# Patient Record
Sex: MALE | Race: WHITE | Employment: OTHER | URBAN - METROPOLITAN AREA
[De-identification: names, ages, dates, MRNs, and addresses within clinical notes are randomized per-mention and may not be internally consistent; named-entity substitution may affect disease eponyms.]

---

## 2017-01-19 ENCOUNTER — OP HISTORICAL/CONVERTED ENCOUNTER (OUTPATIENT)
Dept: OTHER | Age: 52
End: 2017-01-19

## 2017-01-27 ENCOUNTER — OP HISTORICAL/CONVERTED ENCOUNTER (OUTPATIENT)
Dept: OTHER | Age: 52
End: 2017-01-27

## 2017-09-26 ENCOUNTER — OP HISTORICAL/CONVERTED ENCOUNTER (OUTPATIENT)
Dept: OTHER | Age: 52
End: 2017-09-26

## 2017-09-29 ENCOUNTER — OP HISTORICAL/CONVERTED ENCOUNTER (OUTPATIENT)
Dept: OTHER | Age: 52
End: 2017-09-29

## 2017-12-02 ENCOUNTER — ED HISTORICAL/CONVERTED ENCOUNTER (OUTPATIENT)
Dept: OTHER | Age: 52
End: 2017-12-02

## 2019-11-04 ENCOUNTER — OP HISTORICAL/CONVERTED ENCOUNTER (OUTPATIENT)
Dept: OTHER | Age: 54
End: 2019-11-04

## 2022-04-21 ENCOUNTER — HOSPITAL ENCOUNTER (OUTPATIENT)
Dept: PREADMISSION TESTING | Age: 57
Discharge: HOME | End: 2022-04-21
Payer: COMMERCIAL

## 2022-04-21 ENCOUNTER — HOSPITAL ENCOUNTER (OUTPATIENT)
Dept: GENERAL RADIOLOGY | Age: 57
Discharge: HOME | End: 2022-04-21
Attending: ORTHOPAEDIC SURGERY
Payer: COMMERCIAL

## 2022-04-21 VITALS
OXYGEN SATURATION: 100 % | WEIGHT: 176.4 LBS | HEART RATE: 58 BPM | BODY MASS INDEX: 26.73 KG/M2 | HEIGHT: 68 IN | SYSTOLIC BLOOD PRESSURE: 110 MMHG | DIASTOLIC BLOOD PRESSURE: 84 MMHG | TEMPERATURE: 97.8 F | RESPIRATION RATE: 16 BRPM

## 2022-04-21 LAB
ABO + RH BLD: NORMAL
ANION GAP SERPL CALC-SCNC: 5 MMOL/L (ref 5–15)
APPEARANCE UR: CLEAR
APTT PPP: 28.9 SEC (ref 21.2–34.1)
ATRIAL RATE: 53 BPM
BACTERIA URNS QL MICRO: NEGATIVE /HPF
BILIRUB UR QL: NEGATIVE
BLOOD GROUP ANTIBODIES SERPL: NEGATIVE
BUN SERPL-MCNC: 19 MG/DL (ref 6–20)
BUN/CREAT SERPL: 21 (ref 12–20)
CA-I BLD-MCNC: 8.9 MG/DL (ref 8.5–10.1)
CALCULATED P AXIS, ECG09: 64 DEGREES
CALCULATED R AXIS, ECG10: 59 DEGREES
CALCULATED T AXIS, ECG11: 60 DEGREES
CHLORIDE SERPL-SCNC: 105 MMOL/L (ref 97–108)
CO2 SERPL-SCNC: 29 MMOL/L (ref 21–32)
COLOR UR: NORMAL
CREAT SERPL-MCNC: 0.9 MG/DL (ref 0.7–1.3)
DIAGNOSIS, 93000: NORMAL
ERYTHROCYTE [DISTWIDTH] IN BLOOD BY AUTOMATED COUNT: 12.4 % (ref 11.5–14.5)
GLUCOSE SERPL-MCNC: 92 MG/DL (ref 65–100)
GLUCOSE UR STRIP.AUTO-MCNC: NEGATIVE MG/DL
HCT VFR BLD AUTO: 47.5 % (ref 36.6–50.3)
HGB BLD-MCNC: 15.5 G/DL (ref 12.1–17)
HGB UR QL STRIP: NEGATIVE
INR PPP: 1.1 (ref 0.9–1.1)
KETONES UR QL STRIP.AUTO: NEGATIVE MG/DL
LEUKOCYTE ESTERASE UR QL STRIP.AUTO: NEGATIVE
MCH RBC QN AUTO: 29.9 PG (ref 26–34)
MCHC RBC AUTO-ENTMCNC: 32.6 G/DL (ref 30–36.5)
MCV RBC AUTO: 91.7 FL (ref 80–99)
MRSA DNA SPEC QL NAA+PROBE: NOT DETECTED
MUCOUS THREADS URNS QL MICRO: NORMAL /LPF
NITRITE UR QL STRIP.AUTO: NEGATIVE
NRBC # BLD: 0 K/UL (ref 0–0.01)
NRBC BLD-RTO: 0 PER 100 WBC
P-R INTERVAL, ECG05: 196 MS
PH UR STRIP: 7 [PH] (ref 5–8)
PLATELET # BLD AUTO: 211 K/UL (ref 150–400)
PMV BLD AUTO: 9.5 FL (ref 8.9–12.9)
POTASSIUM SERPL-SCNC: 4.3 MMOL/L (ref 3.5–5.1)
PROT UR STRIP-MCNC: NEGATIVE MG/DL
PROTHROMBIN TIME: 14 SEC (ref 11.9–14.6)
Q-T INTERVAL, ECG07: 428 MS
QRS DURATION, ECG06: 88 MS
QTC CALCULATION (BEZET), ECG08: 401 MS
RBC # BLD AUTO: 5.18 M/UL (ref 4.1–5.7)
RBC #/AREA URNS HPF: NORMAL /HPF (ref 0–5)
SODIUM SERPL-SCNC: 139 MMOL/L (ref 136–145)
SP GR UR REFRACTOMETRY: 1.01 (ref 1–1.03)
SPECIMEN EXP DATE BLD: NORMAL
THERAPEUTIC RANGE,PTTT: NORMAL SEC (ref 82–109)
UROBILINOGEN UR QL STRIP.AUTO: 0.1 EU/DL (ref 0.1–1)
VENTRICULAR RATE, ECG03: 53 BPM
WBC # BLD AUTO: 5.5 K/UL (ref 4.1–11.1)
WBC URNS QL MICRO: NORMAL /HPF (ref 0–4)

## 2022-04-21 PROCEDURE — 86900 BLOOD TYPING SEROLOGIC ABO: CPT

## 2022-04-21 PROCEDURE — 81001 URINALYSIS AUTO W/SCOPE: CPT

## 2022-04-21 PROCEDURE — 87086 URINE CULTURE/COLONY COUNT: CPT

## 2022-04-21 PROCEDURE — 85610 PROTHROMBIN TIME: CPT

## 2022-04-21 PROCEDURE — 93005 ELECTROCARDIOGRAM TRACING: CPT

## 2022-04-21 PROCEDURE — 87641 MR-STAPH DNA AMP PROBE: CPT

## 2022-04-21 PROCEDURE — 85027 COMPLETE CBC AUTOMATED: CPT

## 2022-04-21 PROCEDURE — 80048 BASIC METABOLIC PNL TOTAL CA: CPT

## 2022-04-21 PROCEDURE — 71046 X-RAY EXAM CHEST 2 VIEWS: CPT

## 2022-04-21 PROCEDURE — 36415 COLL VENOUS BLD VENIPUNCTURE: CPT

## 2022-04-21 PROCEDURE — 85730 THROMBOPLASTIN TIME PARTIAL: CPT

## 2022-04-21 RX ORDER — VITAMIN E 268 MG
400 CAPSULE ORAL DAILY
COMMUNITY

## 2022-04-21 RX ORDER — ASCORBIC ACID 250 MG
250 TABLET ORAL DAILY
COMMUNITY

## 2022-04-22 LAB
BACTERIA SPEC CULT: NORMAL
SPECIAL REQUESTS,SREQ: NORMAL

## 2022-05-02 RX ORDER — METOPROLOL TARTRATE 5 MG/5ML
INJECTION INTRAVENOUS
Status: DISPENSED
Start: 2022-05-02 | End: 2022-05-03

## 2022-05-03 ENCOUNTER — APPOINTMENT (OUTPATIENT)
Dept: GENERAL RADIOLOGY | Age: 57
End: 2022-05-03
Attending: ORTHOPAEDIC SURGERY
Payer: COMMERCIAL

## 2022-05-03 ENCOUNTER — HOSPITAL ENCOUNTER (OUTPATIENT)
Age: 57
Discharge: HOME | End: 2022-05-04
Attending: ORTHOPAEDIC SURGERY | Admitting: ORTHOPAEDIC SURGERY
Payer: COMMERCIAL

## 2022-05-03 ENCOUNTER — ANESTHESIA (OUTPATIENT)
Dept: SURGERY | Age: 57
End: 2022-05-03
Payer: COMMERCIAL

## 2022-05-03 ENCOUNTER — ANESTHESIA EVENT (OUTPATIENT)
Dept: SURGERY | Age: 57
End: 2022-05-03
Payer: COMMERCIAL

## 2022-05-03 DIAGNOSIS — M43.06 LUMBAR SPONDYLOLYSIS: Primary | ICD-10-CM

## 2022-05-03 PROCEDURE — 74011000250 HC RX REV CODE- 250: Performed by: NURSE PRACTITIONER

## 2022-05-03 PROCEDURE — C1713 ANCHOR/SCREW BN/BN,TIS/BN: HCPCS | Performed by: ORTHOPAEDIC SURGERY

## 2022-05-03 PROCEDURE — 77030031139 HC SUT VCRL2 J&J -A: Performed by: ORTHOPAEDIC SURGERY

## 2022-05-03 PROCEDURE — 76000 FLUOROSCOPY <1 HR PHYS/QHP: CPT

## 2022-05-03 PROCEDURE — 77030041237 HC BLNKT WARM MDII -A: Performed by: ORTHOPAEDIC SURGERY

## 2022-05-03 PROCEDURE — 77030018673: Performed by: ORTHOPAEDIC SURGERY

## 2022-05-03 PROCEDURE — 77030005515 HC CATH URETH FOL14 BARD -B: Performed by: ORTHOPAEDIC SURGERY

## 2022-05-03 PROCEDURE — C1889 IMPLANT/INSERT DEVICE, NOC: HCPCS | Performed by: ORTHOPAEDIC SURGERY

## 2022-05-03 PROCEDURE — 74011250636 HC RX REV CODE- 250/636: Performed by: ORTHOPAEDIC SURGERY

## 2022-05-03 PROCEDURE — 77030008480 HC STYL W/CAP J&J -C: Performed by: ORTHOPAEDIC SURGERY

## 2022-05-03 PROCEDURE — 74011000250 HC RX REV CODE- 250: Performed by: ORTHOPAEDIC SURGERY

## 2022-05-03 PROCEDURE — 72100 X-RAY EXAM L-S SPINE 2/3 VWS: CPT

## 2022-05-03 PROCEDURE — 77030028271 HC SRGFL HEMSTAT MTRX KT J&J -C: Performed by: ORTHOPAEDIC SURGERY

## 2022-05-03 PROCEDURE — 77030038156 HC CRD BPLR DISP CARF -A: Performed by: ORTHOPAEDIC SURGERY

## 2022-05-03 PROCEDURE — 76060000042 HC ANESTHESIA 5.5 TO 6 HR: Performed by: ORTHOPAEDIC SURGERY

## 2022-05-03 PROCEDURE — 76010000178 HC OR TIME 5.5 TO 6 HR INTENSV-TIER 1: Performed by: ORTHOPAEDIC SURGERY

## 2022-05-03 PROCEDURE — 74011250637 HC RX REV CODE- 250/637: Performed by: ORTHOPAEDIC SURGERY

## 2022-05-03 PROCEDURE — 76210000016 HC OR PH I REC 1 TO 1.5 HR: Performed by: ORTHOPAEDIC SURGERY

## 2022-05-03 PROCEDURE — 77030040361 HC SLV COMPR DVT MDII -B: Performed by: ORTHOPAEDIC SURGERY

## 2022-05-03 PROCEDURE — 74011250636 HC RX REV CODE- 250/636: Performed by: NURSE PRACTITIONER

## 2022-05-03 PROCEDURE — 77030034479 HC ADH SKN CLSR PRINEO J&J -B: Performed by: ORTHOPAEDIC SURGERY

## 2022-05-03 PROCEDURE — 2709999900 HC NON-CHARGEABLE SUPPLY: Performed by: ORTHOPAEDIC SURGERY

## 2022-05-03 PROCEDURE — 77030002933 HC SUT MCRYL J&J -A: Performed by: ORTHOPAEDIC SURGERY

## 2022-05-03 PROCEDURE — 87086 URINE CULTURE/COLONY COUNT: CPT

## 2022-05-03 PROCEDURE — 77030011264 HC ELECTRD BLD EXT COVD -A: Performed by: ORTHOPAEDIC SURGERY

## 2022-05-03 PROCEDURE — P9045 ALBUMIN (HUMAN), 5%, 250 ML: HCPCS | Performed by: NURSE PRACTITIONER

## 2022-05-03 PROCEDURE — 36415 COLL VENOUS BLD VENIPUNCTURE: CPT

## 2022-05-03 DEVICE — IMPLANTABLE DEVICE: Type: IMPLANTABLE DEVICE | Site: SPINE LUMBAR | Status: FUNCTIONAL

## 2022-05-03 DEVICE — SCREW SPNL L45MM DIA6MM TI POLYAX EXT TAB FOR MINIMALLY: Type: IMPLANTABLE DEVICE | Site: SPINE LUMBAR | Status: FUNCTIONAL

## 2022-05-03 DEVICE — SCREW SPNL L50MM DIA7MM TI POLYAX EXT TAB FOR MINIMALLY: Type: IMPLANTABLE DEVICE | Site: SPINE LUMBAR | Status: FUNCTIONAL

## 2022-05-03 DEVICE — GRAFT BNE SUB M 5ML CANC DBM FRMBL CELLULAR VIVIGEN: Type: IMPLANTABLE DEVICE | Site: SPINE LUMBAR | Status: FUNCTIONAL

## 2022-05-03 DEVICE — SET SCR SPNL TI SGL INNR FOR VIPER 2 MINIMALLY INVASIVE: Type: IMPLANTABLE DEVICE | Site: SPINE LUMBAR | Status: FUNCTIONAL

## 2022-05-03 RX ORDER — SODIUM CHLORIDE 0.9 % (FLUSH) 0.9 %
5-40 SYRINGE (ML) INJECTION EVERY 8 HOURS
Status: DISCONTINUED | OUTPATIENT
Start: 2022-05-03 | End: 2022-05-03

## 2022-05-03 RX ORDER — LIDOCAINE HYDROCHLORIDE 10 MG/ML
0.1 INJECTION, SOLUTION EPIDURAL; INFILTRATION; INTRACAUDAL; PERINEURAL AS NEEDED
Status: DISCONTINUED | OUTPATIENT
Start: 2022-05-03 | End: 2022-05-03 | Stop reason: HOSPADM

## 2022-05-03 RX ORDER — MIDAZOLAM HYDROCHLORIDE 1 MG/ML
INJECTION, SOLUTION INTRAMUSCULAR; INTRAVENOUS AS NEEDED
Status: DISCONTINUED | OUTPATIENT
Start: 2022-05-03 | End: 2022-05-03 | Stop reason: HOSPADM

## 2022-05-03 RX ORDER — NALOXONE HYDROCHLORIDE 0.4 MG/ML
0.4 INJECTION, SOLUTION INTRAMUSCULAR; INTRAVENOUS; SUBCUTANEOUS AS NEEDED
Status: DISCONTINUED | OUTPATIENT
Start: 2022-05-03 | End: 2022-05-04 | Stop reason: HOSPADM

## 2022-05-03 RX ORDER — LABETALOL HCL 20 MG/4 ML
5 SYRINGE (ML) INTRAVENOUS
Status: DISCONTINUED | OUTPATIENT
Start: 2022-05-03 | End: 2022-05-03 | Stop reason: HOSPADM

## 2022-05-03 RX ORDER — DIAZEPAM 10 MG/2ML
5 INJECTION INTRAMUSCULAR ONCE
Status: COMPLETED | OUTPATIENT
Start: 2022-05-03 | End: 2022-05-03

## 2022-05-03 RX ORDER — FENTANYL CITRATE 50 UG/ML
INJECTION, SOLUTION INTRAMUSCULAR; INTRAVENOUS AS NEEDED
Status: DISCONTINUED | OUTPATIENT
Start: 2022-05-03 | End: 2022-05-03 | Stop reason: HOSPADM

## 2022-05-03 RX ORDER — DIPHENHYDRAMINE HYDROCHLORIDE 50 MG/ML
12.5 INJECTION, SOLUTION INTRAMUSCULAR; INTRAVENOUS AS NEEDED
Status: DISCONTINUED | OUTPATIENT
Start: 2022-05-03 | End: 2022-05-03 | Stop reason: HOSPADM

## 2022-05-03 RX ORDER — CELECOXIB 200 MG/1
400 CAPSULE ORAL ONCE
Status: COMPLETED | OUTPATIENT
Start: 2022-05-03 | End: 2022-05-03

## 2022-05-03 RX ORDER — LIDOCAINE HYDROCHLORIDE 20 MG/ML
INJECTION, SOLUTION EPIDURAL; INFILTRATION; INTRACAUDAL; PERINEURAL AS NEEDED
Status: DISCONTINUED | OUTPATIENT
Start: 2022-05-03 | End: 2022-05-03 | Stop reason: HOSPADM

## 2022-05-03 RX ORDER — SODIUM CHLORIDE 0.9 % (FLUSH) 0.9 %
5-40 SYRINGE (ML) INJECTION AS NEEDED
Status: DISCONTINUED | OUTPATIENT
Start: 2022-05-03 | End: 2022-05-04 | Stop reason: HOSPADM

## 2022-05-03 RX ORDER — ACETAMINOPHEN 325 MG/1
650 TABLET ORAL EVERY 6 HOURS
Status: DISCONTINUED | OUTPATIENT
Start: 2022-05-03 | End: 2022-05-04 | Stop reason: HOSPADM

## 2022-05-03 RX ORDER — SODIUM CHLORIDE 9 MG/ML
INJECTION, SOLUTION INTRAVENOUS
Status: DISCONTINUED | OUTPATIENT
Start: 2022-05-03 | End: 2022-05-03 | Stop reason: HOSPADM

## 2022-05-03 RX ORDER — DEXAMETHASONE SODIUM PHOSPHATE 4 MG/ML
INJECTION, SOLUTION INTRA-ARTICULAR; INTRALESIONAL; INTRAMUSCULAR; INTRAVENOUS; SOFT TISSUE AS NEEDED
Status: DISCONTINUED | OUTPATIENT
Start: 2022-05-03 | End: 2022-05-03 | Stop reason: HOSPADM

## 2022-05-03 RX ORDER — ONDANSETRON 2 MG/ML
4 INJECTION INTRAMUSCULAR; INTRAVENOUS AS NEEDED
Status: DISCONTINUED | OUTPATIENT
Start: 2022-05-03 | End: 2022-05-03 | Stop reason: HOSPADM

## 2022-05-03 RX ORDER — OXYCODONE HYDROCHLORIDE 5 MG/1
5 TABLET ORAL
Status: DISCONTINUED | OUTPATIENT
Start: 2022-05-03 | End: 2022-05-04 | Stop reason: HOSPADM

## 2022-05-03 RX ORDER — HYDROMORPHONE HYDROCHLORIDE 1 MG/ML
0.4 INJECTION, SOLUTION INTRAMUSCULAR; INTRAVENOUS; SUBCUTANEOUS
Status: DISCONTINUED | OUTPATIENT
Start: 2022-05-03 | End: 2022-05-03 | Stop reason: HOSPADM

## 2022-05-03 RX ORDER — PROPOFOL 10 MG/ML
INJECTION, EMULSION INTRAVENOUS AS NEEDED
Status: DISCONTINUED | OUTPATIENT
Start: 2022-05-03 | End: 2022-05-03 | Stop reason: HOSPADM

## 2022-05-03 RX ORDER — SODIUM CHLORIDE 0.9 % (FLUSH) 0.9 %
5-40 SYRINGE (ML) INJECTION EVERY 8 HOURS
Status: DISCONTINUED | OUTPATIENT
Start: 2022-05-03 | End: 2022-05-03 | Stop reason: HOSPADM

## 2022-05-03 RX ORDER — FACIAL-BODY WIPES
10 EACH TOPICAL DAILY PRN
Status: DISCONTINUED | OUTPATIENT
Start: 2022-05-05 | End: 2022-05-04 | Stop reason: HOSPADM

## 2022-05-03 RX ORDER — NORETHINDRONE AND ETHINYL ESTRADIOL 0.5-0.035
KIT ORAL AS NEEDED
Status: DISCONTINUED | OUTPATIENT
Start: 2022-05-03 | End: 2022-05-03 | Stop reason: HOSPADM

## 2022-05-03 RX ORDER — FENTANYL CITRATE 50 UG/ML
50 INJECTION, SOLUTION INTRAMUSCULAR; INTRAVENOUS AS NEEDED
Status: DISCONTINUED | OUTPATIENT
Start: 2022-05-03 | End: 2022-05-03 | Stop reason: HOSPADM

## 2022-05-03 RX ORDER — SUCCINYLCHOLINE CHLORIDE 20 MG/ML
INJECTION INTRAMUSCULAR; INTRAVENOUS AS NEEDED
Status: DISCONTINUED | OUTPATIENT
Start: 2022-05-03 | End: 2022-05-03 | Stop reason: HOSPADM

## 2022-05-03 RX ORDER — VITAMIN E CAP 100 UNIT 100 UNIT
400 CAP ORAL DAILY
Status: DISCONTINUED | OUTPATIENT
Start: 2022-05-04 | End: 2022-05-04 | Stop reason: CLARIF

## 2022-05-03 RX ORDER — OXYCODONE HCL 10 MG/1
10 TABLET, FILM COATED, EXTENDED RELEASE ORAL ONCE
Status: COMPLETED | OUTPATIENT
Start: 2022-05-03 | End: 2022-05-03

## 2022-05-03 RX ORDER — OXYCODONE AND ACETAMINOPHEN 5; 325 MG/1; MG/1
1 TABLET ORAL AS NEEDED
Status: DISCONTINUED | OUTPATIENT
Start: 2022-05-03 | End: 2022-05-03 | Stop reason: HOSPADM

## 2022-05-03 RX ORDER — AMOXICILLIN 250 MG
1 CAPSULE ORAL 2 TIMES DAILY
Status: DISCONTINUED | OUTPATIENT
Start: 2022-05-03 | End: 2022-05-04 | Stop reason: HOSPADM

## 2022-05-03 RX ORDER — SODIUM CHLORIDE 0.9 % (FLUSH) 0.9 %
5-40 SYRINGE (ML) INJECTION AS NEEDED
Status: DISCONTINUED | OUTPATIENT
Start: 2022-05-03 | End: 2022-05-03 | Stop reason: HOSPADM

## 2022-05-03 RX ORDER — HYDROMORPHONE HYDROCHLORIDE 1 MG/ML
INJECTION, SOLUTION INTRAMUSCULAR; INTRAVENOUS; SUBCUTANEOUS AS NEEDED
Status: DISCONTINUED | OUTPATIENT
Start: 2022-05-03 | End: 2022-05-03 | Stop reason: HOSPADM

## 2022-05-03 RX ORDER — HYDROMORPHONE HYDROCHLORIDE 1 MG/ML
INJECTION, SOLUTION INTRAMUSCULAR; INTRAVENOUS; SUBCUTANEOUS
Status: DISPENSED
Start: 2022-05-03 | End: 2022-05-04

## 2022-05-03 RX ORDER — MORPHINE SULFATE 2 MG/ML
2 INJECTION, SOLUTION INTRAMUSCULAR; INTRAVENOUS
Status: ACTIVE | OUTPATIENT
Start: 2022-05-03 | End: 2022-05-04

## 2022-05-03 RX ORDER — ALBUMIN HUMAN 50 G/1000ML
SOLUTION INTRAVENOUS AS NEEDED
Status: DISCONTINUED | OUTPATIENT
Start: 2022-05-03 | End: 2022-05-03 | Stop reason: HOSPADM

## 2022-05-03 RX ORDER — DIAZEPAM 5 MG/1
5 TABLET ORAL
Status: DISCONTINUED | OUTPATIENT
Start: 2022-05-03 | End: 2022-05-03 | Stop reason: SDUPTHER

## 2022-05-03 RX ORDER — MIDAZOLAM HYDROCHLORIDE 1 MG/ML
0.5 INJECTION, SOLUTION INTRAMUSCULAR; INTRAVENOUS
Status: DISCONTINUED | OUTPATIENT
Start: 2022-05-03 | End: 2022-05-03 | Stop reason: HOSPADM

## 2022-05-03 RX ORDER — POLYETHYLENE GLYCOL 3350 17 G/17G
17 POWDER, FOR SOLUTION ORAL DAILY
Status: DISCONTINUED | OUTPATIENT
Start: 2022-05-04 | End: 2022-05-04 | Stop reason: HOSPADM

## 2022-05-03 RX ORDER — DIAZEPAM 5 MG/1
5 TABLET ORAL
Status: DISCONTINUED | OUTPATIENT
Start: 2022-05-03 | End: 2022-05-04 | Stop reason: HOSPADM

## 2022-05-03 RX ORDER — SODIUM CHLORIDE, SODIUM LACTATE, POTASSIUM CHLORIDE, CALCIUM CHLORIDE 600; 310; 30; 20 MG/100ML; MG/100ML; MG/100ML; MG/100ML
INJECTION, SOLUTION INTRAVENOUS
Status: DISCONTINUED | OUTPATIENT
Start: 2022-05-03 | End: 2022-05-03 | Stop reason: HOSPADM

## 2022-05-03 RX ORDER — BUPIVACAINE HYDROCHLORIDE 2.5 MG/ML
INJECTION, SOLUTION EPIDURAL; INFILTRATION; INTRACAUDAL AS NEEDED
Status: DISCONTINUED | OUTPATIENT
Start: 2022-05-03 | End: 2022-05-03 | Stop reason: HOSPADM

## 2022-05-03 RX ORDER — ROCURONIUM BROMIDE 10 MG/ML
INJECTION, SOLUTION INTRAVENOUS AS NEEDED
Status: DISCONTINUED | OUTPATIENT
Start: 2022-05-03 | End: 2022-05-03 | Stop reason: HOSPADM

## 2022-05-03 RX ORDER — ONDANSETRON 2 MG/ML
INJECTION INTRAMUSCULAR; INTRAVENOUS AS NEEDED
Status: DISCONTINUED | OUTPATIENT
Start: 2022-05-03 | End: 2022-05-03 | Stop reason: HOSPADM

## 2022-05-03 RX ORDER — KETOROLAC TROMETHAMINE 30 MG/ML
30 INJECTION, SOLUTION INTRAMUSCULAR; INTRAVENOUS EVERY 6 HOURS
Status: COMPLETED | OUTPATIENT
Start: 2022-05-03 | End: 2022-05-04

## 2022-05-03 RX ORDER — MORPHINE SULFATE 10 MG/ML
2 INJECTION, SOLUTION INTRAMUSCULAR; INTRAVENOUS
Status: DISCONTINUED | OUTPATIENT
Start: 2022-05-03 | End: 2022-05-03 | Stop reason: HOSPADM

## 2022-05-03 RX ORDER — SODIUM CHLORIDE, SODIUM LACTATE, POTASSIUM CHLORIDE, CALCIUM CHLORIDE 600; 310; 30; 20 MG/100ML; MG/100ML; MG/100ML; MG/100ML
20 INJECTION, SOLUTION INTRAVENOUS CONTINUOUS
Status: DISCONTINUED | OUTPATIENT
Start: 2022-05-03 | End: 2022-05-03

## 2022-05-03 RX ORDER — NORETHINDRONE AND ETHINYL ESTRADIOL 0.5-0.035
5 KIT ORAL AS NEEDED
Status: DISCONTINUED | OUTPATIENT
Start: 2022-05-03 | End: 2022-05-03 | Stop reason: HOSPADM

## 2022-05-03 RX ORDER — MIDAZOLAM HYDROCHLORIDE 1 MG/ML
1 INJECTION, SOLUTION INTRAMUSCULAR; INTRAVENOUS AS NEEDED
Status: DISCONTINUED | OUTPATIENT
Start: 2022-05-03 | End: 2022-05-03 | Stop reason: HOSPADM

## 2022-05-03 RX ORDER — METOPROLOL TARTRATE 5 MG/5ML
2.5 INJECTION INTRAVENOUS
Status: DISCONTINUED | OUTPATIENT
Start: 2022-05-03 | End: 2022-05-03 | Stop reason: HOSPADM

## 2022-05-03 RX ORDER — CEFAZOLIN SODIUM 1 G/3ML
INJECTION, POWDER, FOR SOLUTION INTRAMUSCULAR; INTRAVENOUS AS NEEDED
Status: DISCONTINUED | OUTPATIENT
Start: 2022-05-03 | End: 2022-05-03 | Stop reason: HOSPADM

## 2022-05-03 RX ORDER — FENTANYL CITRATE 50 UG/ML
50 INJECTION, SOLUTION INTRAMUSCULAR; INTRAVENOUS
Status: DISCONTINUED | OUTPATIENT
Start: 2022-05-03 | End: 2022-05-03 | Stop reason: HOSPADM

## 2022-05-03 RX ORDER — OXYCODONE HYDROCHLORIDE 10 MG/1
10 TABLET ORAL
Status: DISCONTINUED | OUTPATIENT
Start: 2022-05-03 | End: 2022-05-04 | Stop reason: HOSPADM

## 2022-05-03 RX ORDER — ONDANSETRON 2 MG/ML
4 INJECTION INTRAMUSCULAR; INTRAVENOUS
Status: ACTIVE | OUTPATIENT
Start: 2022-05-03 | End: 2022-05-04

## 2022-05-03 RX ORDER — ASCORBIC ACID 250 MG
250 TABLET ORAL DAILY
Status: DISCONTINUED | OUTPATIENT
Start: 2022-05-04 | End: 2022-05-04 | Stop reason: HOSPADM

## 2022-05-03 RX ORDER — SODIUM CHLORIDE 9 MG/ML
125 INJECTION, SOLUTION INTRAVENOUS CONTINUOUS
Status: DISPENSED | OUTPATIENT
Start: 2022-05-03 | End: 2022-05-04

## 2022-05-03 RX ORDER — HYDRALAZINE HYDROCHLORIDE 20 MG/ML
10 INJECTION INTRAMUSCULAR; INTRAVENOUS
Status: DISCONTINUED | OUTPATIENT
Start: 2022-05-03 | End: 2022-05-03 | Stop reason: HOSPADM

## 2022-05-03 RX ADMIN — OXYCODONE HYDROCHLORIDE 10 MG: 10 TABLET, FILM COATED, EXTENDED RELEASE ORAL at 07:45

## 2022-05-03 RX ADMIN — FENTANYL CITRATE 50 MCG: 50 INJECTION, SOLUTION INTRAMUSCULAR; INTRAVENOUS at 09:04

## 2022-05-03 RX ADMIN — LIDOCAINE HYDROCHLORIDE 100 MG: 20 INJECTION, SOLUTION EPIDURAL; INFILTRATION; INTRACAUDAL; PERINEURAL at 09:39

## 2022-05-03 RX ADMIN — EPHEDRINE SULFATE 10 MG: 50 INJECTION INTRAVENOUS at 09:38

## 2022-05-03 RX ADMIN — EPHEDRINE SULFATE 10 MG: 50 INJECTION INTRAVENOUS at 13:17

## 2022-05-03 RX ADMIN — SODIUM CHLORIDE: 9 INJECTION, SOLUTION INTRAVENOUS at 09:15

## 2022-05-03 RX ADMIN — CELECOXIB 400 MG: 200 CAPSULE ORAL at 07:22

## 2022-05-03 RX ADMIN — ROCURONIUM BROMIDE 30 MG: 50 INJECTION, SOLUTION INTRAVENOUS at 11:28

## 2022-05-03 RX ADMIN — PHENYLEPHRINE HYDROCHLORIDE 100 MCG: 10 INJECTION INTRAVENOUS at 09:21

## 2022-05-03 RX ADMIN — HYDROMORPHONE HYDROCHLORIDE 1 MG: 1 INJECTION, SOLUTION INTRAMUSCULAR; INTRAVENOUS; SUBCUTANEOUS at 13:48

## 2022-05-03 RX ADMIN — FENTANYL CITRATE 50 MCG: 50 INJECTION, SOLUTION INTRAMUSCULAR; INTRAVENOUS at 09:08

## 2022-05-03 RX ADMIN — SUGAMMADEX 200 MG: 100 INJECTION, SOLUTION INTRAVENOUS at 14:12

## 2022-05-03 RX ADMIN — PHENYLEPHRINE HYDROCHLORIDE 100 MCG: 10 INJECTION INTRAVENOUS at 13:17

## 2022-05-03 RX ADMIN — DIAZEPAM 5 MG: 10 INJECTION, SOLUTION INTRAMUSCULAR; INTRAVENOUS at 15:02

## 2022-05-03 RX ADMIN — KETOROLAC TROMETHAMINE 30 MG: 30 INJECTION, SOLUTION INTRAMUSCULAR; INTRAVENOUS at 19:15

## 2022-05-03 RX ADMIN — DEXAMETHASONE SODIUM PHOSPHATE 4 MG: 4 INJECTION, SOLUTION INTRA-ARTICULAR; INTRALESIONAL; INTRAMUSCULAR; INTRAVENOUS; SOFT TISSUE at 09:12

## 2022-05-03 RX ADMIN — HYDROMORPHONE HYDROCHLORIDE 1 MG: 1 INJECTION, SOLUTION INTRAMUSCULAR; INTRAVENOUS; SUBCUTANEOUS at 14:44

## 2022-05-03 RX ADMIN — ACETAMINOPHEN 650 MG: 325 TABLET ORAL at 19:15

## 2022-05-03 RX ADMIN — PROPOFOL 50 MG: 10 INJECTION, EMULSION INTRAVENOUS at 09:07

## 2022-05-03 RX ADMIN — SODIUM CHLORIDE, POTASSIUM CHLORIDE, SODIUM LACTATE AND CALCIUM CHLORIDE: 600; 310; 30; 20 INJECTION, SOLUTION INTRAVENOUS at 08:57

## 2022-05-03 RX ADMIN — ROCURONIUM BROMIDE 20 MG: 50 INJECTION, SOLUTION INTRAVENOUS at 12:07

## 2022-05-03 RX ADMIN — SODIUM CHLORIDE 125 ML/HR: 9 INJECTION, SOLUTION INTRAVENOUS at 17:30

## 2022-05-03 RX ADMIN — CEFAZOLIN SODIUM 2 G: 1 INJECTION, POWDER, FOR SOLUTION INTRAMUSCULAR; INTRAVENOUS at 13:21

## 2022-05-03 RX ADMIN — OXYCODONE HYDROCHLORIDE 10 MG: 10 TABLET ORAL at 20:34

## 2022-05-03 RX ADMIN — SODIUM CHLORIDE, POTASSIUM CHLORIDE, SODIUM LACTATE AND CALCIUM CHLORIDE 20 ML/HR: 600; 310; 30; 20 INJECTION, SOLUTION INTRAVENOUS at 07:16

## 2022-05-03 RX ADMIN — SENNOSIDES AND DOCUSATE SODIUM 1 TABLET: 50; 8.6 TABLET ORAL at 20:34

## 2022-05-03 RX ADMIN — SUCCINYLCHOLINE CHLORIDE 180 MG: 20 INJECTION, SOLUTION INTRAMUSCULAR; INTRAVENOUS at 09:04

## 2022-05-03 RX ADMIN — PROPOFOL 150 MG: 10 INJECTION, EMULSION INTRAVENOUS at 09:04

## 2022-05-03 RX ADMIN — ALBUMIN (HUMAN) 250 ML: 12.5 INJECTION, SOLUTION INTRAVENOUS at 09:49

## 2022-05-03 RX ADMIN — PHENYLEPHRINE HYDROCHLORIDE 100 MCG: 10 INJECTION INTRAVENOUS at 10:41

## 2022-05-03 RX ADMIN — PHENYLEPHRINE HYDROCHLORIDE 100 MCG: 10 INJECTION INTRAVENOUS at 09:26

## 2022-05-03 RX ADMIN — ONDANSETRON 4 MG: 2 INJECTION INTRAMUSCULAR; INTRAVENOUS at 09:12

## 2022-05-03 RX ADMIN — EPHEDRINE SULFATE 10 MG: 50 INJECTION INTRAVENOUS at 10:41

## 2022-05-03 RX ADMIN — SODIUM CHLORIDE, POTASSIUM CHLORIDE, SODIUM LACTATE AND CALCIUM CHLORIDE: 600; 310; 30; 20 INJECTION, SOLUTION INTRAVENOUS at 14:13

## 2022-05-03 RX ADMIN — CEFAZOLIN SODIUM 2 G: 1 INJECTION, POWDER, FOR SOLUTION INTRAMUSCULAR; INTRAVENOUS at 09:30

## 2022-05-03 RX ADMIN — MIDAZOLAM HYDROCHLORIDE 2 MG: 2 INJECTION, SOLUTION INTRAMUSCULAR; INTRAVENOUS at 08:57

## 2022-05-03 RX ADMIN — OXYCODONE HYDROCHLORIDE 10 MG: 10 TABLET ORAL at 16:47

## 2022-05-03 NOTE — PROGRESS NOTES
Pt arrived to unit accompanied by PACU nurse. Vital signs stable. Prineo on lower back clean, dry, intact. Some swelling noted around incision sites. PACU nurse stated Dr. Cheri Holland looked at incisions at the bedside and said they looked okay. Will continue to monitor surgical sites.

## 2022-05-03 NOTE — OP NOTES
Indication for Procedure  Mr. Margaret Lindo is a 63 yo man who failed conservative treatment of L2-5 mild degenerative scoliosis with stenosis and neurogenic claudication as well grade II lytic spondylolisthesis at L5-S1 with left worse than right leg radiculopathy. He underwent PT, medical management of pain, activity modifications and epidural steroid injections without relief of symptoms. I recommended L2-5 lateral fusion with cage, followed by left approach L5-S1 mini TLIF with cage and L2-S1 percutaneous pedicle screw instrumentation. We discussed the benefits and risks of surgery in detail and the patient consented to the procedure after this discussion. Preoperative Diagnosis  1. DDD L2-5 with degenerative scoliosis  2. Lumbar stenosis L2-3, L3-4 and L4-5 with claudication  3. L5-S1 lytic spondylolisthesis with left > right lumbar radiculopathy    Postoperative Diagnosis  1. DDD L2-5 with degenerative scoliosis  2. Lumbar recurrent stenosis L2-3, L3-4 and L4-5 with claudication  3.  L5-S1 lytic spondylolisthesis with left > right lumbar radiculopathy    Operation (This is the first of two dictations on the same day)  L4-5 anterior transpsoas lumbar interbody fusion with allograft (28308)  L4-5 cage reconstruction (80950)  L3-4 anterior transpsoas lumbar interbody fusion with allograft (02135)  L3-4 cage reconstruction (67958)  L2-3 anterior transpsoas lumbar interbody fusion with allograft (22497)  L2-3 cage reconstruction (13914)    Surgeon(s)  Yelitza Iyer MD    Assistant  Jessica Lin PA-C    Anesthesia  General endotracheal    Estimated Blood Loss   20 cc for this portion of the procedure, 100 cc total for the day    Findings  Correction of scoliosis    Specimen(s)  None    Complications  None    Implants  ConsortiEX Conduit cage 29h50g45 mm cage at L4-5, 24g23x40 mm cage at L3-4 and L2-3  Vivigen Formable allograft 5cc    Disposition  Stable to the posterior portion of the procedure, which is dictated separately. Technique  The patient was identified in the holding area and the operative site was marked. Consent was reviewed with the patient. The patient was taken to the OR and placed on the OR table for induction of anesthesia and intubation. He was turned to the right lateral decubitus position and his left flank was prepped and draped in the usual sterile fashion. The level of the incision was marked with fluoroscopy to guide placement. The appropriate time out procedure was called and carried out. The patient received 2 g of Ancef appropriately within 1 hour prior to surgery. The longitudinal incision was placed over the L2-3, L3-4 and L4-5 disc spaces just proximal to the iliac crest.  Blunt dissection was carried out to enter the retroperitoneal space. The first dilator was docked onto the psoas muscle over the L4-5 disc space and passed through the psoas using neuromonitoring to stay anterior to the nerves. Sequential dilation was carried out with no nerve irritation noted. The working port was docked appropriately. The confines of the working port were probed with a neuromonitoring probe with no neurologic elements noted. At L4-5 the angled instruments were required due to the position of the disc below the level of the iliac crest.  The annulus was incised with a scalpel and discectomy was carried out with the Francis elevator as well curettes. The contralateral annulus was disrupted with the Francis elevator. Cage sizing was carried out and the appropriate cage was packed with Vivigen allograft. The endplates were prepared with a rasp. The cage was impacted into place and noted to be in an acceptable position on fluoroscopic views. The retractor was withdrawn after establishing hemostasis. Using the same incision, the retractor was then docked over the L3-4 disc space using neuromonitoring. The working tube was docked at the L3-4 disc.   The L3-4 disc space was then prepared as detailed above for L4-5. Cage sizing was carried out and the appropriate cage was packed with Vivigen allograft. The endplates were prepared with a rasp. The cage was impacted into place and noted to be in an acceptable position on fluoroscopic views. The retractor was again removed after establishing hemostasis. Using the same incision, through a second, more proximal opening in the abdominal wall, the working port was then docked over the L2-3 disc space using neuromonitoring. The L2-3 disc space was then prepared as detailed above for L3-4 and L4-5. Cage sizing was carried out and the appropriate cage was packed with Vivigen allograft. The endplates were prepared with a rasp. The cage was impacted into place and noted to be in an acceptable position on fluoroscopic views. The working port was again removed after establishing hemostasis. The fascia was closed with #1 Vicryl at both openings in the abdominal wall. The skin was then closed using 2-0 Vicryl. Dermabond Prineo was applied over the wound. My PA assisted with patient positioning, prep, draping, retraction of the psoas, clearing of the instruments, packing of cage and closure. The patient was then turned to the prone position on the Fish table for the 2nd portion of the procedure which is dictated separately.

## 2022-05-03 NOTE — PROGRESS NOTES
Pt stated has had oxycodone before and no reaction, but does not want it this morning before his procedure.

## 2022-05-03 NOTE — PROGRESS NOTES
Called provider to clarify medication order. Written order for oxycodone 10mg extended release tablet PO x 1 dose. Provider stated pt and pt's wife stated no reaction to oxycodone and has had it in the past. Provider stated no new orders and to give oxycodone closer to start of surgery. Informed floater Nurse Pablo Cordero RN on bedside report.

## 2022-05-03 NOTE — ANESTHESIA PREPROCEDURE EVALUATION
Relevant Problems   No relevant active problems       Anesthetic History   No history of anesthetic complications            Review of Systems / Medical History  Patient summary reviewed, nursing notes reviewed and pertinent labs reviewed    Pulmonary  Within defined limits                 Neuro/Psych   Within defined limits           Cardiovascular  Within defined limits                     GI/Hepatic/Renal  Within defined limits              Endo/Other  Within defined limits           Other Findings            Past Medical History:   Diagnosis Date    Ill-defined condition 1992    broken right humerous       Past Surgical History:   Procedure Laterality Date    HX BACK SURGERY  2016 & 2017    cervical fusion    HX COLONOSCOPY      HX ORTHOPAEDIC Left 2015    bicep reattachment    HX OTHER SURGICAL  2005    melanoma  and lymph nodes removed from back and left axilla    HX SHOULDER ARTHROSCOPY Right 2012       Current Outpatient Medications   Medication Instructions    ascorbic acid (vitamin C) (VITAMIN C) 250 mg, Oral, DAILY    OTHER Oral, DAILY, NutriRise Super Immunity Complete Complex with Elderberry, vitamin c and zinc     Sea-Omega 500-1,000 mg cap 1,100 mg, Oral, DAILY, Naturelo Omega 3 triglyceride fish oil     Vitamin D3-Menaquinone 7 1000-90 unit-mcg TbDi Oral, DAILY, 1000 iu- 45 mcg    vitamin E (AQUA GEMS) 400 Units, Oral, DAILY       Current Facility-Administered Medications   Medication Dose Route Frequency    lactated Ringers infusion  20 mL/hr IntraVENous CONTINUOUS    ceFAZolin (ANCEF) 2 g in sterile water (preservative free) 20 mL IV syringe  2 g IntraVENous ONCE       Patient Vitals for the past 24 hrs:   Temp Pulse Resp BP SpO2   05/03/22 0656 36.6 °C (97.8 °F) (!) 59 16 139/84 98 %       Lab Results   Component Value Date/Time    WBC 5.5 04/21/2022 12:12 PM    HGB 15.5 04/21/2022 12:12 PM    HCT 47.5 04/21/2022 12:12 PM    PLATELET 701 54/67/7988 12:12 PM    MCV 91.7 04/21/2022 12:12 PM     Lab Results   Component Value Date/Time    Sodium 139 04/21/2022 12:12 PM    Potassium 4.3 04/21/2022 12:12 PM    Chloride 105 04/21/2022 12:12 PM    CO2 29 04/21/2022 12:12 PM    Anion gap 5 04/21/2022 12:12 PM    Glucose 92 04/21/2022 12:12 PM    BUN 19 04/21/2022 12:12 PM    Creatinine 0.90 04/21/2022 12:12 PM    BUN/Creatinine ratio 21 (H) 04/21/2022 12:12 PM    GFR est AA >60 04/21/2022 12:12 PM    GFR est non-AA >60 04/21/2022 12:12 PM    Calcium 8.9 04/21/2022 12:12 PM     No results found for: APTT, PTP, INR, INREXT  Lab Results   Component Value Date/Time    Glucose 92 04/21/2022 12:12 PM     Physical Exam    Airway  Mallampati: II  TM Distance: 4 - 6 cm  Neck ROM: normal range of motion   Mouth opening: Normal     Cardiovascular    Rhythm: regular  Rate: normal         Dental  No notable dental hx       Pulmonary  Breath sounds clear to auscultation               Abdominal  GI exam deferred       Other Findings            Anesthetic Plan    ASA: 1  Anesthesia type: general          Induction: Intravenous  Anesthetic plan and risks discussed with: Patient and Family

## 2022-05-03 NOTE — PERIOP NOTES
TRANSFER - OUT REPORT:    Verbal report given to Yaakov Cruz RN (name) on Jose Eastman  being transferred to [address] [place] [address] Drive (unit) for routine post - op       Report consisted of patients Situation, Background, Assessment and   Recommendations(SBAR). Information from the following report(s) Procedure Summary and MAR was reviewed with the receiving nurse. Opportunity for questions and clarification was provided.       Patient transported with:   Registered Nurse

## 2022-05-03 NOTE — OP NOTES
Indication for Procedure  Mr. Dung Fischer is a 63 yo man who failed conservative treatment of L2-5 mild degenerative scoliosis with stenosis and neurogenic claudication as well grade II lytic spondylolisthesis at L5-S1 with left worse than right leg radiculopathy. He underwent PT, medical management of pain, activity modifications and epidural steroid injections without relief of symptoms. I recommended L2-5 lateral fusion with cage, followed by left approach L5-S1 mini TLIF with cage and L2-S1 percutaneous pedicle screw instrumentation. We discussed the benefits and risks of surgery in detail and the patient consented to the procedure after this discussion. Preoperative Diagnosis  1. DDD L2-5 with degenerative scoliosis  2. Lumbar stenosis L2-3, L3-4 and L4-5 with claudication  3. L5-S1 lytic spondylolisthesis with left > right lumbar radiculopathy    Postoperative Diagnosis  1. DDD L2-5 with degenerative scoliosis  2. Lumbar recurrent stenosis L2-3, L3-4 and L4-5 with claudication  3. L5-S1 lytic spondylolisthesis with left > right lumbar radiculopathy    Procedure Performed  1. L5-S1 left facetectomy for foraminal decompression   2. L5-S1 mini-invasive transforaminal interbody fusion with autograft and allograft (84214)  3. L5-S1 cage reconstruction (68924)  4. L2-S1 percutaneous segmental pedicle screw instrumentation (77946)    Anesthesia  General, endotracheal    Surgeon  Nola Lowery MD    Assistant  Jonathan Carcamo PA-C    Estimated Blood Loss  80 cc for this portion of the procedure, 100 cc total for the day    Complications  None    Disposition  Stable to the recovery room after extubation    Implants  1. DePuy XPAC cage 10x28 mm, 15 deg lordotic, expanded to 14 mm  2. Vivigen 5 cc allograft  3. DePuy Viper Prime screws, 6 mm x 45 mm at L2, 6 x 50 mm at L3, L4 and L5, 7 mm x 50 mm at S1    Details of Operative Intervention  The patient was identified in the holding area and the operative site was marked. Consent was reviewed with the patient. The patient was taken to the OR underwent L2-3, L3-4 and L4-5 lateral approach interbody fusion. He was then turned to the prone position on the True Belch table and all bony prominences were adequately padded. His back was prepped and draped in a sterile fashion after situating biplanar fluoroscopy to visualize the pedicles of L2, L3, L4, L5 and S1. The paramedian incisions were centered over the pedicles of L2, L3, L4, L5 and S1 bilaterally and taken through skin and subcutaneous tissue. On each side, 2 incisions were made. The fascia was incised in line with the skin incision on each side and the wounds were packed to obtain hemostasis. At the left L5-S1 level, the sequential dilators were used to place the spotlight retractor at the facet joint. Facetectomy was then carried out with an osteotome to remove the inferior articular process of L5 and then the superior articular process of S1 after identifying the superior edge of the S1 pedicle. The disc space was identified and the thecal sac was carefully retracted. The exiting L5 root was decompressed thoroughly. Adequate decompression was felt to be achieved. The disc space was entered with a knife followed by a dilator. Sequential dilation and shaving were then carried out. The debris from the disc shaving was removed with pituitary rongeurs. The disc space was prepared with sizers to select the above indicated cage and a rasp to decorticate the endplates. The disc space was packed with Lu Dimas followed by the remaining local bone. The cage was impacted into place and expanded to 14 mm. The cage was then back-filled with 2 cc Vivigen. X-rays confirmed acceptable placement of the cage. Screws were pre selected using MRI preoperative planning. All the screws were applied using the following technique:   The screw tip was first docked to the 3 o'clock position for the right screw and 9 o'clock position for the left screw at the midpoint of the pedicle just lateral to the lateral pedicle wall. The trocar from the Prime screw was then advanced into the pedicle. Once the trocar trajectory was felt to be acceptable, the screw was advanced over the trocar. When the screw tip approached the medial wall of the pedicle on each side, lateral view was used to confirm adequate depth of insertion of the pedicle into the vertebral body to avoid breach of the medial wall, before pedicle screw was appropriately seated. Once all 10 screws were placed, mckayla length was measured and 120 mm rods were reduced into the screw heads bilaterally. End caps were applied and final tightened into place with partial correction of the L5-S1 spondylolisthesis by segmental reduction of screw heads to the rods. Final x-rays confirmed acceptable placement of the rods and screws. Overall alignment of the spine was felt to be acceptable. No change in cage positions was noted. The extension tabs were then broken off each screw using standard technique. My PA assisted with patient positioning, prepping, draping, root retraction and application of screws as well as cleaning and passing of instruments. He assisted with cage packing and application of the screws on his side as well as mckayla placement and endcap tightening. He accomplished the irrigation and closure of the wounds independently. He also assisted with turning the patient to the supine position. The patient was then turned to the supine position and transported to recovery room in stable condition after extubation. He tolerated the procedure well. No complications were encountered.

## 2022-05-04 VITALS
OXYGEN SATURATION: 100 % | HEIGHT: 68 IN | DIASTOLIC BLOOD PRESSURE: 65 MMHG | TEMPERATURE: 97.5 F | RESPIRATION RATE: 16 BRPM | BODY MASS INDEX: 25.76 KG/M2 | HEART RATE: 72 BPM | SYSTOLIC BLOOD PRESSURE: 123 MMHG | WEIGHT: 170 LBS

## 2022-05-04 LAB
ANION GAP SERPL CALC-SCNC: 6 MMOL/L (ref 5–15)
BUN SERPL-MCNC: 15 MG/DL (ref 6–20)
BUN/CREAT SERPL: 18 (ref 12–20)
CA-I BLD-MCNC: 8.4 MG/DL (ref 8.5–10.1)
CHLORIDE SERPL-SCNC: 107 MMOL/L (ref 97–108)
CO2 SERPL-SCNC: 26 MMOL/L (ref 21–32)
CREAT SERPL-MCNC: 0.84 MG/DL (ref 0.7–1.3)
GLUCOSE SERPL-MCNC: 169 MG/DL (ref 65–100)
HGB BLD-MCNC: 12 G/DL (ref 12.1–17)
POTASSIUM SERPL-SCNC: 3.7 MMOL/L (ref 3.5–5.1)
SODIUM SERPL-SCNC: 139 MMOL/L (ref 136–145)

## 2022-05-04 PROCEDURE — 85018 HEMOGLOBIN: CPT

## 2022-05-04 PROCEDURE — 80048 BASIC METABOLIC PNL TOTAL CA: CPT

## 2022-05-04 PROCEDURE — 97165 OT EVAL LOW COMPLEX 30 MIN: CPT

## 2022-05-04 PROCEDURE — 36415 COLL VENOUS BLD VENIPUNCTURE: CPT

## 2022-05-04 PROCEDURE — 97161 PT EVAL LOW COMPLEX 20 MIN: CPT

## 2022-05-04 PROCEDURE — 74011250637 HC RX REV CODE- 250/637: Performed by: ORTHOPAEDIC SURGERY

## 2022-05-04 PROCEDURE — 74011250636 HC RX REV CODE- 250/636: Performed by: ORTHOPAEDIC SURGERY

## 2022-05-04 PROCEDURE — 97530 THERAPEUTIC ACTIVITIES: CPT

## 2022-05-04 PROCEDURE — 97116 GAIT TRAINING THERAPY: CPT

## 2022-05-04 RX ORDER — LANOLIN ALCOHOL/MO/W.PET/CERES
400 CREAM (GRAM) TOPICAL DAILY
Qty: 30 CAPSULE | Refills: 1 | Status: SHIPPED | OUTPATIENT
Start: 2022-05-05 | End: 2022-06-04

## 2022-05-04 RX ORDER — OXYCODONE HYDROCHLORIDE 5 MG/1
5 TABLET ORAL
Qty: 28 TABLET | Refills: 0 | Status: SHIPPED | OUTPATIENT
Start: 2022-05-04 | End: 2022-05-07

## 2022-05-04 RX ORDER — LANOLIN ALCOHOL/MO/W.PET/CERES
400 CREAM (GRAM) TOPICAL DAILY
Status: DISCONTINUED | OUTPATIENT
Start: 2022-05-04 | End: 2022-05-04 | Stop reason: HOSPADM

## 2022-05-04 RX ADMIN — KETOROLAC TROMETHAMINE 30 MG: 30 INJECTION, SOLUTION INTRAMUSCULAR; INTRAVENOUS at 09:55

## 2022-05-04 RX ADMIN — SENNOSIDES AND DOCUSATE SODIUM 1 TABLET: 50; 8.6 TABLET ORAL at 09:53

## 2022-05-04 RX ADMIN — OXYCODONE HYDROCHLORIDE 10 MG: 10 TABLET ORAL at 00:45

## 2022-05-04 RX ADMIN — ACETAMINOPHEN 650 MG: 325 TABLET ORAL at 11:15

## 2022-05-04 RX ADMIN — ACETAMINOPHEN 650 MG: 325 TABLET ORAL at 00:45

## 2022-05-04 RX ADMIN — SODIUM CHLORIDE 125 ML/HR: 9 INJECTION, SOLUTION INTRAVENOUS at 01:55

## 2022-05-04 RX ADMIN — POLYETHYLENE GLYCOL 3350 17 G: 17 POWDER, FOR SOLUTION ORAL at 09:54

## 2022-05-04 RX ADMIN — KETOROLAC TROMETHAMINE 30 MG: 30 INJECTION, SOLUTION INTRAMUSCULAR; INTRAVENOUS at 15:24

## 2022-05-04 RX ADMIN — OXYCODONE 5 MG: 5 TABLET ORAL at 09:54

## 2022-05-04 RX ADMIN — Medication 250 MG: at 09:54

## 2022-05-04 RX ADMIN — KETOROLAC TROMETHAMINE 30 MG: 30 INJECTION, SOLUTION INTRAMUSCULAR; INTRAVENOUS at 01:53

## 2022-05-04 RX ADMIN — ACETAMINOPHEN 650 MG: 325 TABLET ORAL at 06:16

## 2022-05-04 RX ADMIN — OXYCODONE 5 MG: 5 TABLET ORAL at 14:58

## 2022-05-04 NOTE — PROGRESS NOTES
OCCUPATIONAL THERAPY EVALUATION  Patient: Jono Santos (9 y.o. male)  Date: 5/4/2022  Primary Diagnosis: Isthmic spondylolisthesis [M43.10]  Lumbar stenosis with neurogenic claudication [M48.062]  Lumbar spondylolysis [M43.06]  Procedure(s) (LRB):  L2 - L3, L3 - L4, L4 - L5 LATERAL INTERBODY FUSION WITH CAGE, ALLOGRAFT, L5 - S1 MINI INVASIVE TRANSFORAMINAL INTERBODY FUSION WITH CAGE RECONSTRUCTION WITH AUTOGRAFT AND ALLOGRAFT, PERCUTANEOUS PEDICLE SCREW INSTRUMENTATION (N/A) 1 Day Post-Op   Precautions: fall risk, spinal precautions, brace for comfort        ASSESSMENT  Pt is a 63 y/o M with presenting to [facility] for L2-L3, L3-L4, L4-L5 lateral interbody fusion w/ cage, allograft and cage reconstruction and L2-S1 percutaneous pedicle screw instrumentation following failed conservative treatment of L2-5 mild degerenative scoliosis w/ stenosis and neurogenic claudication as well grade II lytic spondylolisth.esis at L5-S1 with left worse than right leg radiculopathy. Pt is POD #1. Pt received in straight back chair upon arrival, AXO x4, and agreeable to OT evaluation at this time. Per pt report, pt lives with wife in a one-story home with 2 VERONICA the front and no HR (5 VERONICA back w/ no HR), was IND with ADLs and mobility at [facility]. Pt reports he has not been able to stand up right since [place]. He has a walk in shower w/ a built in chair and a reacher. Based on current observations, pt presents with deficits in generalized strength/AROM, bed mobility, static/dynamic standing balance, and functional activity tolerance impacting overall performance of ADLs and functional transfers/mobility. Pt reported he had been in the chair for several hours this morning and reported a new ache in L toes, however, no numbness/tingling. OT educated pt on spinal precautions prior ambulation; pt verbalized understanding. He req'd SBA to stand w/ gait belt donned; pt declined needing the brace during ambulation.  He ambulated to bathroom and completed bathroom commode transfer using L grab bar w/ SBA; no LOB or unsteadiness noted. Pt then requested to ambulate in hallway d/t reduce stiffness from sitting in jennifer; pt ambulated in hallway w/ no safety concerns. He returned to EOB and req'd min A for sit>supine via log roll (pt req'd A w/ LB). He declined ice pack in bed and no longer reported ache in L toes. OT provided pt w/ long handled sponge for bathing and educated pt on dressing/ADLs techniques to maintain spinal precautions; he verbalized understanding. He was left in semi-supine w/ all needs in reach. Overall, pt tolerates session well with c/o slight discomfort in L lower back, however, no pain reported. He demo'd good understanding of spinal precautions and safety awareness. Pt would benefit from continued skilled OT services to prevent decline during hospital admission, address current impairments, and improve IND and safety with self cares and functional transfers/mobility. Current OT d/c recommendation home w/ family care once medically appropriate. Other factors to consider for discharge: family/social support, DME, time since onset, severity of deficits, decline from functional baseline     Patient will benefit from skilled therapy intervention to address the above noted impairments. PLAN :  Recommendations and Planned Interventions: self care training, functional mobility training, therapeutic exercise, balance training, therapeutic activities, endurance activities, patient education and home safety training    Frequency/Duration: Patient will be followed by occupational therapy:  3-5x/week to address goals.     Recommendation for discharge: (in order for the patient to meet his/her long term goals)  Home with Family Care    This discharge recommendation:  Has been made in collaboration with the attending provider and/or case management    IF patient discharges home will need the following DME: none at this time SUBJECTIVE:   Patient stated I have not been able to stand upright since [place].     OBJECTIVE DATA SUMMARY:   HISTORY:   Past Medical History:   Diagnosis Date    Ill-defined condition 1992    broken right humerous     Past Surgical History:   Procedure Laterality Date    HX BACK SURGERY  2016 & 2017    cervical fusion    HX COLONOSCOPY      HX ORTHOPAEDIC Left 2015    bicep reattachment    HX OTHER SURGICAL  2005    melanoma  and lymph nodes removed from back and left axilla    HX SHOULDER ARTHROSCOPY Right 2012       Expanded or extensive additional review of patient history:     Home Situation  Home Environment: Private residence  # Steps to Enter: 2  Rails to Enter: No  One/Two Story Residence: One story  Living Alone: No  Support Systems: Spouse/Significant Other  Patient Expects to be Discharged to[de-identified] Home  Current DME Used/Available at Home: Other (comment) (built in shower chair, reacher )  Tub or Shower Type: Shower      EXAMINATION OF PERFORMANCE DEFICITS:  Cognitive/Behavioral Status:  Neurologic State: Alert  Orientation Level: Oriented X4  Cognition: Appropriate decision making; Appropriate safety awareness; Follows commands               Range of Motion:  AROM: Within functional limits  PROM: Within functional limits                      Strength:  Strength: Within functional limits (demo'd good UB strength during session; B hand  strength 5/5)                Coordination:     Fine Motor Skills-Upper: Left Intact; Right Intact    Gross Motor Skills-Upper: Left Intact; Right Intact    Tone & Sensation:     Sensation: Intact                      Balance:  Sitting: Intact; Without support  Standing: Intact; Without support    Functional Mobility and Transfers for ADLs:  Bed Mobility:  Sit to Supine: Minimum assistance (provided A w/ LB )    Transfers:  Sit to Stand: Stand-by assistance  Stand to Sit: Stand-by assistance  Bathroom Mobility: Stand-by assistance  Toilet Transfer : Stand-by assistance; Other (comment) (L grab bar )        Therapeutic Exercise:  Pt will benefit from BUE HEP to improve participation in ADLs and mobility. Plan will be initiated at next session. Functional Measure:    [facility] AM-PACTM \"6 Clicks\"                                                       Daily Activity Inpatient Short Form  How much help from another person does the patient currently need. .. Total; A Lot A Little None   1. Putting on and taking off regular lower body clothing? []  1 []  2 [x]  3 []  4   2. Bathing (including washing, rinsing, drying)? []  1 []  2 [x]  3 []  4   3. Toileting, which includes using toilet, bedpan or urinal? [] 1 []  2 [x]  3 []  4   4. Putting on and taking off regular upper body clothing? []  1 []  2 [x]  3 []  4   5. Taking care of personal grooming such as brushing teeth? []  1 []  2 [x]  3 []  4   6. Eating meals? []  1 []  2 []  3 [x]  4   © 2007, Trustees of [facility], under license to [employer]. All rights reserved     Score: 19/24     Interpretation of Tool:  Represents clinically-significant functional categories (i.e. Activities of daily living). Percentage of Impairment CH    0%   CI    1-19% CJ    20-39% CK    40-59% CL    60-79% CM    80-99% CN     100%   [facility]  Score 6-24 24 23 20-22 15-19 10-14 7-9 6         Occupational Therapy Evaluation Charge Determination   History Examination Decision-Making   LOW Complexity : Brief history review  LOW Complexity : 1-3 performance deficits relating to physical, cognitive , or psychosocial skils that result in activity limitations and / or participation restrictions  MEDIUM Complexity : Patient may present with comorbidities that affect occupational performnce.  Miniml to moderate modification of tasks or assistance (eg, physical or verbal ) with assesment(s) is necessary to enable patient to complete evaluation       Based on the above components, the patient evaluation is determined to be of the following complexity level: LOW   Pain Rating:  Pt reported slight discomfort in L lower back, however, no pain reported. Activity Tolerance:   Good and tolerates ADLs without rest breaks    After treatment patient left in no apparent distress:    Supine in bed, Call bell within reach and Side rails x 3    COMMUNICATION/EDUCATION:   The patients plan of care was discussed with: Registered nurse. Patient/family have participated as able in goal setting and plan of care. and Patient/family agree to work toward stated goals and plan of care. This patients plan of care is appropriate for delegation to [facility]. Thank you for this referral.  Kizzy Bowie, OT  Time Calculation: 29 mins   Problem: Self Care Deficits Care Plan (Adult)  Goal: *Acute Goals and Plan of Care (Insert Text)  Description: Pt stated goal \"I want to get back to working out\".    Pt will be IND sup <>sit in prep for EOB ADLs  Pt will be IND grooming standing at sinktop  Pt will be Mod I LE dressing sitting EOB/long sit LRAD  Pt will be IND sit <>  prep for toileting LRAD  Pt will be IND toileting/toilet transfer/cloth mgmt LRAD  Pt will be IND following UE HEP in prep for self care tasks     Outcome: Not Met

## 2022-05-04 NOTE — ROUTINE PROCESS
Noticed on patients back and left flank a non- raised redness on skin, patient denies itching, and pain and discomfort.

## 2022-05-04 NOTE — PROGRESS NOTES
Discharge instructions reviewed with patient and his wife. Pt verbalized understanding. Vital signs stable and patient shows no signs of distress. Prescriptions were sent to preferred pharmacy and follow up appointments were made. Pt was walked down by staff and left in a private vehicle with his wife.

## 2022-05-04 NOTE — DISCHARGE SUMMARY
Ortho Discharge Summary      Patient ID:  Ashley Brothers  [id number]  48 y.o.  1965    Allergies: Erythromycin and Lortab [hydrocodone-acetaminophen]    Admit date: 5/3/2022    Discharge date and time: No discharge date for patient encounter. Admitting Physician: Muna Sands MD     Discharge Physician: Dr Herman Menjivar    * Admission Diagnoses: Isthmic spondylolisthesis [M43.10]  Lumbar stenosis with neurogenic claudication [M48.062]  Lumbar spondylolysis [M43.06]    * Discharge Diagnoses:   Hospital Problems as of 5/4/2022 Date Reviewed: 5/4/2022          Codes Class Noted - Resolved POA    Lumbar spondylolysis ICD-10-CM: M43.06  ICD-9-CM: 738.4  5/3/2022 - Present Unknown              Surgeon: Dr Ceferino Amezquita    Preoperative Medical Clearance: none    * Procedure: Procedure(s):  L2 - L3, L3 - L4, L4 - L5 LATERAL INTERBODY FUSION WITH CAGE, ALLOGRAFT, L5 - S1 MINI INVASIVE TRANSFORAMINAL INTERBODY FUSION WITH CAGE RECONSTRUCTION WITH AUTOGRAFT AND ALLOGRAFT, PERCUTANEOUS PEDICLE SCREW INSTRUMENTATION           Perioperative Antibiotics: Ancef           Postoperative Pain Management:  Oral oxycodone and some iv meds post op    DVT Prophylaxis:  SCDs    Postoperative transfusions:    none  Post Op complications: none    Discharge Condition: good  Wound appears to be healing without any evidence of infection. Physical Therapy started on the day following surgery and progressed to independent ambulation with the aid of a walker. At the time of discharge, able to go up and down stairs and had understanding of precautions needed following surgery. PT cleared, ambulating freely.     * Discharged to: Home    * Follow-up Care/Discharge instructions:  - Anticoagulate none, walk 250-300 yds at home, Calf Pumps  - Resume pre hospital diet            - Resume home medications per medical continuation form     - Ambulate ad connie, appropriate protocol  - Follow up in office as scheduled       Signed:  Wes Adames MD  5/4/2022  3:21 PM

## 2022-05-04 NOTE — PROGRESS NOTES
Pt has a discharge order in for today. Pt has been cleared by attending provider. Pt is being discharge home self care. Pt is not being discharged with an IV, drain, or gutierrez. Surgical sites with Dermabond and Prineo are clean, dry, and intact. Vital signs are stable and pt shows no signs of distress. Discharge plan of care/case management plan validated with provider discharge order.

## 2022-05-04 NOTE — ROUTINE PROCESS
Bedside shift change report given to Mignon Riddle (oncoming nurse) by Israel Ayala (offgoing nurse). Report included the following information SBAR.

## 2022-05-04 NOTE — PROGRESS NOTES
PHYSICAL THERAPY EVALUATION  Patient: Candace Hayden (48 y.o. male)  Date: 5/4/2022  Primary Diagnosis: Isthmic spondylolisthesis [M43.10]  Lumbar stenosis with neurogenic claudication [M48.062]  Lumbar spondylolysis [M43.06]  Procedure(s) (LRB):  L2 - L3, L3 - L4, L4 - L5 LATERAL INTERBODY FUSION WITH CAGE, ALLOGRAFT, L5 - S1 MINI INVASIVE TRANSFORAMINAL INTERBODY FUSION WITH CAGE RECONSTRUCTION WITH AUTOGRAFT AND ALLOGRAFT, PERCUTANEOUS PEDICLE SCREW INSTRUMENTATION (N/A) 1 Day Post-Op   Precautions: lumbar precautions      ASSESSMENT    60yo M s/p lumbar fusion presents to PT and at this time as performed all tasks needed in acute PT for safe d/c home with family care. Pt supine in bed upon PT arrival, agreeable to work with PT. Pt lives in 1 story home with spouse, 2 VERONICA home in the front without rails. PTA pt was I with ADLS and amb without AD. Currently, pt is SBA for supine to sit EOB, SBA for sit to stand transfers. Pt able to amb approx 250ft with SBAx1 wearing lumbar brace without difficulty or LOB. Pt able to ascend/descend 4 steps with rail and CGAx1 without difficulty. Pt able to verbalize lumbar precautions and demonstrate appropriate mobility. Pt at this time has met mobility criteria for acute PT for safe return home and at this time will be d/c from acute PT. Recommend d/c home with family care at this time.        PLAN :  Recommendations and Planned Interventions: d/c PT post eval, no further acute PT needs        Recommendation for discharge: (in order for the patient to meet his/her long term goals)  Home with family care           SUBJECTIVE:   Patient supine in bed upon PT arrival, agreeable to work with PT    OBJECTIVE DATA SUMMARY:   HISTORY:    Past Medical History:   Diagnosis Date    Ill-defined condition 1992    broken right humerous     Past Surgical History:   Procedure Laterality Date    HX BACK SURGERY  2016 & 2017    cervical fusion    HX COLONOSCOPY      HX ORTHOPAEDIC Left 2015    bicep reattachment    HX OTHER SURGICAL  2005    melanoma  and lymph nodes removed from back and left axilla    HX SHOULDER ARTHROSCOPY Right 2012       Personal factors and/or comorbidities impacting plan of care:     Home Situation  Home Environment: Private residence  # Steps to Enter: 2  Rails to Enter: No  One/Two Story Residence: One story  Living Alone: No  Support Systems: Spouse/Significant Other  Patient Expects to be Discharged to[de-identified] Home with home health  Current DME Used/Available at Home: Other (comment) (built in shower chair, reacher )  Tub or Shower Type: Shower        EXAMINATION/PRESENTATION/DECISION MAKING:   Critical Behavior:  Neurologic State: Alert  Orientation Level: Oriented X4  Cognition: Appropriate decision making,Appropriate safety awareness,Follows commands           Range Of Motion:  AROM: Within functional limits  B LE       Strength:    Strength: Within functional limits  Grossly 4/5 B LE    Tone & Sensation:                  Sensation: Intact       Functional Mobility:  Bed Mobility:     Supine to Sit: Stand-by assistance  Sit to Supine: Stand-by assistance     Transfers:  Sit to Stand: Stand-by assistance  Stand to Sit: Stand-by assistance                       Balance:   Sitting: Intact; Without support  Standing: Intact; Without support  Ambulation/Gait Training:  Distance (ft): 250 Feet (ft)  Assistive Device: Gait belt  Ambulation - Level of Assistance: Stand-by assistance          Stairs:  Number of Stairs Trained: 4  Stairs - Level of Assistance: Contact guard assistance   Rail Use: Both        Functional Measure:  [facility] [address] [id number] AM-PAC 6 Clicks         Basic Mobility Inpatient Short Form  How much difficulty does the patient currently have. .. Unable A Lot A Little None   1. Turning over in bed (including adjusting bedclothes, sheets and blankets)? [] 1   [] 2   [] 3   [x] 4   2.   Sitting down on and standing up from a chair with arms ( e.g., wheelchair, bedside commode, etc.)   [] 1   [] 2   [] 3   [x] 4   3. Moving from lying on back to sitting on the side of the bed? [] 1   [] 2   [] 3   [x] 4          How much help from another person does the patient currently need. .. Total A Lot A Little None   4. Moving to and from a bed to a chair (including a wheelchair)? [] 1   [] 2   [] 3   [x] 4   5. Need to walk in hospital room? [] 1   [] 2   [x] 3   [] 4   6. Climbing 3-5 steps with a railing? [] 1   [] 2   [x] 3   [] 4   © 2007, Trustees of [address] [place] [address] [address] [id number], under license to [employer]. All rights reserved     Score:  Initial: 22 Most Recent: X (Date: -- )   Interpretation of Tool:  Represents activities that are increasingly more difficult (i.e. Bed mobility, Transfers, Gait). Score 24 23 22-20 19-15 14-10 9-7 6   Modifier CH CI CJ CK CL CM CN           Physical Therapy Evaluation Charge Determination   History Examination Presentation Decision-Making   MEDIUM  Complexity : 1-2 comorbidities / personal factors will impact the outcome/ POC  LOW Complexity : 1-2 Standardized tests and measures addressing body structure, function, activity limitation and / or participation in recreation  LOW Complexity : Stable, uncomplicated  Other Outcome Measure: AMPAC Other outcome measures [facility]  LOW       Based on the above components, the patient evaluation is determined to be of the following complexity level: LOW     Pain Rating:  No c/o pain, just a little discomfort    Activity Tolerance:   Good      After treatment patient left in no apparent distress:   Supine in bed and Call bell within reach    GOALS:  No goals    COMMUNICATION/EDUCATION:   The patients plan of care was discussed with: Registered nurse.          Thank you for this referral.  Adam Farfan   Time Calculation: 20 mins

## 2022-05-04 NOTE — PROGRESS NOTES
Medicare Outpatient Observation Notice (MOON)/ [place] Outpatient Observation Notice (Nathaly Ortez) provided to patient/representative with verbal explanation of the notice. Time allotted for questions regarding the notice. Patient /representative provided a completed copy of the MOON/VOON notice. Copy placed on bedside chart. Recommendations from therapy, home self care. No needs identified.

## 2022-05-05 LAB
BACTERIA SPEC CULT: NORMAL
SPECIAL REQUESTS,SREQ: NORMAL

## 2022-07-07 NOTE — ANESTHESIA POSTPROCEDURE EVALUATION
Procedure(s):  L2 - L3, L3 - L4, L4 - L5 LATERAL INTERBODY FUSION WITH CAGE, ALLOGRAFT, L5 - S1 MINI INVASIVE TRANSFORAMINAL INTERBODY FUSION WITH CAGE RECONSTRUCTION WITH AUTOGRAFT AND ALLOGRAFT, PERCUTANEOUS PEDICLE SCREW INSTRUMENTATION.     general    Anesthesia Post Evaluation      Multimodal analgesia: multimodal analgesia used between 6 hours prior to anesthesia start to PACU discharge  Patient location during evaluation: PACU  Patient participation: complete - patient participated  Level of consciousness: awake  Pain score: 0  Pain management: adequate  Airway patency: patent  Anesthetic complications: no  Cardiovascular status: acceptable  Respiratory status: acceptable  Hydration status: acceptable  Post anesthesia nausea and vomiting:  controlled  Final Post Anesthesia Temperature Assessment:  Normothermia (36.0-37.5 degrees C)      INITIAL Post-op Vital signs:   Vitals Value Taken Time   /67 05/03/22 1545   Temp 36.4 °C (97.6 °F) 05/03/22 1545   Pulse 89 05/03/22 1545   Resp 16 05/03/22 1545   SpO2 97 % 05/03/22 1545

## (undated) DEVICE — Device

## (undated) DEVICE — CHS NEURO PLUS 1: Brand: MEDLINE INDUSTRIES, INC.

## (undated) DEVICE — CONTAINER,SPECIMEN,3OZ,OR STRL: Brand: MEDLINE

## (undated) DEVICE — DRAPE SURG TOWEL SM 18X12 IN INVISISHIELD MP W/ ADH PLAS NS

## (undated) DEVICE — CORD BPLR 12FT SGL USE CLR

## (undated) DEVICE — DRAPE,REIN 53X77,STERILE: Brand: MEDLINE

## (undated) DEVICE — THE MILL DISPOSABLE - MEDIUM

## (undated) DEVICE — 3M™ IOBAN™ 2 ANTIMICROBIAL INCISE DRAPE 6650EZ: Brand: IOBAN™ 2

## (undated) DEVICE — ROCKER SWITCH PENCIL BLADE ELECTRODE, HOLSTER: Brand: EDGE

## (undated) DEVICE — INSERT CUSH HDRST PRONE AD LG --

## (undated) DEVICE — BASIC SINGLE BASIN-LF: Brand: MEDLINE INDUSTRIES, INC.

## (undated) DEVICE — GARMENT,MEDLINE,DVT,INT,CALF,MED, GEN2: Brand: MEDLINE

## (undated) DEVICE — TIP SUCTION FLANGE YANKAUER FLX NO VENT FN CAP STRL

## (undated) DEVICE — GLOVE SURG SZ 75 L12IN FNGR THK79MIL GRN LTX FREE

## (undated) DEVICE — SUTURE VCRL + SZ 2-0 L27IN ABSRB UD CP-1 1/2 CIR REV CUT VCP266H

## (undated) DEVICE — DRAPE EQUIP C ARM 74X42 IN MOB XR W/ TIE RUBBER BND LF

## (undated) DEVICE — TRAY URIN CATH PED 16FR BLLN 5CC INDWL STR TIP INF CTRL

## (undated) DEVICE — SUT MCRYL + 3-0 27IN PS1 UD --

## (undated) DEVICE — COVER,TABLE,HEAVY DUTY,79"X110",STRL: Brand: MEDLINE

## (undated) DEVICE — SEALANT HEMOSTAT W/THROM 8ML -- SURGIFLO MATRIX

## (undated) DEVICE — STYLET SURG VIPER PRIM

## (undated) DEVICE — INTENDED FOR TISSUE SEPARATION, AND OTHER PROCEDURES THAT REQUIRE A SHARP SURGICAL BLADE TO PUNCTURE OR CUT.: Brand: BARD-PARKER ® CARBON RIB-BACK BLADES

## (undated) DEVICE — SOUTHSIDE TURNOVER: Brand: MEDLINE INDUSTRIES, INC.

## (undated) DEVICE — C-ARMOR C-ARM EQUIPMENT COVERS CLEAR STERILE UNIVERSAL FIT 12 PER CASE: Brand: C-ARMOR

## (undated) DEVICE — GLOVE ORANGE PI 7 1/2   MSG9075

## (undated) DEVICE — DRAPE,T,LAPARO,TRANS,STERILE: Brand: MEDLINE

## (undated) DEVICE — COVER LT HNDL BLU PLAS

## (undated) DEVICE — BLADE ELECTRODE: Brand: EDGE

## (undated) DEVICE — MAGNETIC INSTR DRAPE 20X16: Brand: MEDLINE INDUSTRIES, INC.

## (undated) DEVICE — 3M™ STERI-DRAPE™ INSTRUMENT POUCH 1018: Brand: STERI-DRAPE™

## (undated) DEVICE — PREP SKN CHLRAPRP APL 26ML STR --

## (undated) DEVICE — LAMINECTOMY ARM CRADLE FOAM POSITIONER: Brand: CARDINAL HEALTH

## (undated) DEVICE — TIBURON UNIVERSAL SPINE DRAPE: Brand: CONVERTORS

## (undated) DEVICE — TAPE,CLOTH/SILK,CURAD,3"X10YD,LF,40/CS: Brand: CURAD

## (undated) DEVICE — REM POLYHESIVE ADULT PATIENT RETURN ELECTRODE: Brand: VALLEYLAB

## (undated) DEVICE — SYSTEM SKIN CLSR 22CM DERMBND PRINEO

## (undated) DEVICE — GLOVE SURG SZ 8 L12IN FNGR THK79MIL GRN LTX FREE

## (undated) DEVICE — ULNAR NERVE PROTECTOR FOAM POSITIONER: Brand: CARDINAL HEALTH

## (undated) DEVICE — SUT VCRL + 1 27IN OS6 VIO --

## (undated) DEVICE — GOWN,PREVENTION PLUS,XLN/XL,ST,24/CS: Brand: MEDLINE

## (undated) DEVICE — PROBE 9450015 NIM BALL TIP 23CM

## (undated) DEVICE — SOLUTION IRRIG 1000ML 0.9% SOD CHL USP POUR PLAS BTL

## (undated) DEVICE — TOWEL SURG W17XL27IN STD BLU COT NONFENESTRATED PREWASHED